# Patient Record
Sex: FEMALE
[De-identification: names, ages, dates, MRNs, and addresses within clinical notes are randomized per-mention and may not be internally consistent; named-entity substitution may affect disease eponyms.]

---

## 2017-09-25 ENCOUNTER — RESULT REVIEW (OUTPATIENT)
Age: 52
End: 2017-09-25

## 2018-09-24 PROBLEM — Z00.00 ENCOUNTER FOR PREVENTIVE HEALTH EXAMINATION: Status: ACTIVE | Noted: 2018-09-24

## 2018-12-05 ENCOUNTER — TRANSCRIPTION ENCOUNTER (OUTPATIENT)
Age: 53
End: 2018-12-05

## 2020-02-13 ENCOUNTER — TRANSCRIPTION ENCOUNTER (OUTPATIENT)
Age: 55
End: 2020-02-13

## 2020-02-14 ENCOUNTER — TRANSCRIPTION ENCOUNTER (OUTPATIENT)
Age: 55
End: 2020-02-14

## 2020-03-04 ENCOUNTER — RESULT REVIEW (OUTPATIENT)
Age: 55
End: 2020-03-04

## 2021-02-04 ENCOUNTER — RESULT REVIEW (OUTPATIENT)
Age: 56
End: 2021-02-04

## 2022-02-16 ENCOUNTER — RESULT REVIEW (OUTPATIENT)
Age: 57
End: 2022-02-16

## 2022-09-21 ENCOUNTER — NON-APPOINTMENT (OUTPATIENT)
Age: 57
End: 2022-09-21

## 2023-07-06 ENCOUNTER — NON-APPOINTMENT (OUTPATIENT)
Age: 58
End: 2023-07-06

## 2023-11-10 ENCOUNTER — NON-APPOINTMENT (OUTPATIENT)
Age: 58
End: 2023-11-10

## 2023-12-19 ENCOUNTER — NON-APPOINTMENT (OUTPATIENT)
Age: 58
End: 2023-12-19

## 2023-12-25 ENCOUNTER — NON-APPOINTMENT (OUTPATIENT)
Age: 58
End: 2023-12-25

## 2024-02-11 ENCOUNTER — NON-APPOINTMENT (OUTPATIENT)
Age: 59
End: 2024-02-11

## 2024-02-14 ENCOUNTER — NON-APPOINTMENT (OUTPATIENT)
Age: 59
End: 2024-02-14

## 2024-02-14 ENCOUNTER — APPOINTMENT (OUTPATIENT)
Dept: ORTHOPEDIC SURGERY | Facility: CLINIC | Age: 59
End: 2024-02-14
Payer: OTHER MISCELLANEOUS

## 2024-02-14 VITALS — WEIGHT: 150 LBS | BODY MASS INDEX: 24.11 KG/M2 | HEIGHT: 66 IN

## 2024-02-14 PROCEDURE — 25600 CLTX DST RDL FX/EPHYS SEP WO: CPT | Mod: RT

## 2024-02-14 PROCEDURE — 99204 OFFICE O/P NEW MOD 45 MIN: CPT | Mod: 57,25

## 2024-02-14 PROCEDURE — 73130 X-RAY EXAM OF HAND: CPT | Mod: RT

## 2024-02-14 PROCEDURE — 29075 APPL CST ELBW FNGR SHORT ARM: CPT | Mod: RT

## 2024-02-14 NOTE — WORK
[Fracture] : fracture [Was the competent medical cause of the injury] : was the competent medical cause of the injury [Are consistent with the injury] : are consistent with the injury [Consistent with my objective findings] : consistent with my objective findings [Moderate Partial] : moderate partial [Does not reveal pre-existing condition(s) that may affect treatment/prognosis] : does not reveal pre-existing condition(s) that may affect treatment/prognosis [Can return to work without limitations on ______] : can return to work without limitations on [unfilled] [No Rx restrictions] : No Rx restrictions. [I provided the services listed above] :  I provided the services listed above. [FreeTextEntry1] : good

## 2024-02-14 NOTE — IMAGING
[de-identified] : right hand/wrist swollen TTP about mattie Distal radius stiff fingers nvid  3 views of the wrist revied- nondisplaced distal radius fracture

## 2024-02-14 NOTE — ASSESSMENT
[FreeTextEntry1] : The patient was advised of the diagnosis. The natural history of the pathology was explained in full to the patient in layman's terms. All questions were answered. The risks and benefits of surgical and non-surgical treatment alternatives were explained in full to the patient. NSAIDs recommended.  Patient warned of risk of NSAID medication to stomach and GI tract, risk of increase blood pressure, cardiac risk, and risk of fluid retention.  The patient should clear taking medication with internist/PMD if any problem with heart, blood pressure, or GI system exists. The patient was advised to apply ice (wrapped in a towel or protective covering) to the area daily (20 minutes at a time, 2-4X/day short arm cats placed A cast was applied.  The importance of ice and elevation were discussed with the patient.  The risks were also discussed such as compartment syndrome and skin breakdown.  They were instructed to never put foreign objects down the cast.  Patients should call for increasing pain, worsening swelling, numbness, extremity discoloration, or any other concerns.

## 2024-02-14 NOTE — HISTORY OF PRESENT ILLNESS
[7] : 7 [5] : 5 [Dull/Aching] : dull/aching [Sharp] : sharp [Intermittent] : intermittent [Nothing helps with pain getting better] : Nothing helps with pain getting better [de-identified] : WC DOI 2/12/24  LHD F on day of injury fell down 2 steps at work onto right hand. told she has a small fracture. +pain to palpation works in personnel.  Pmhx: none Meds: none NKDA [] : no [FreeTextEntry1] : RT Wrist and RT hand  [FreeTextEntry5] : RT hand and Wrist slip and fall at work. denies N/T. Pt had X-rays taken at [facility] UC was Dx with a Small Fx.

## 2024-03-07 ENCOUNTER — APPOINTMENT (OUTPATIENT)
Dept: ORTHOPEDIC SURGERY | Facility: CLINIC | Age: 59
End: 2024-03-07
Payer: OTHER MISCELLANEOUS

## 2024-03-07 VITALS — BODY MASS INDEX: 24.11 KG/M2 | WEIGHT: 150 LBS | HEIGHT: 66 IN

## 2024-03-07 PROCEDURE — 73110 X-RAY EXAM OF WRIST: CPT | Mod: RT

## 2024-03-07 PROCEDURE — 99024 POSTOP FOLLOW-UP VISIT: CPT

## 2024-03-07 NOTE — ASSESSMENT
[FreeTextEntry1] : The patient was advised of the diagnosis. The natural history of the pathology was explained in full to the patient in layman's terms. All questions were answered. The risks and benefits of surgical and non-surgical treatment alternatives were explained in full to the patient.  C/w cast F/u in 3 weeks for xrays out of cast

## 2024-03-07 NOTE — IMAGING
[de-identified] : right wrist: SAC intact farom of fingers nvid  3 views of the wrist revied- nondisplaced distal radius fracture in acceptable alignment

## 2024-03-07 NOTE — HISTORY OF PRESENT ILLNESS
[7] : 7 [Dull/Aching] : dull/aching [5] : 5 [Sharp] : sharp [Intermittent] : intermittent [Nothing helps with pain getting better] : Nothing helps with pain getting better [de-identified] : WC DOI 2/12/24  3/7/24:  Pt is comfortable in cast  LHD F on day of injury fell down 2 steps at work onto right hand. told she has a small fracture. +pain to palpation works in personnel.  Pmhx: none Meds: none NKDA [FreeTextEntry1] : RT Wrist and RT hand  [] : no [FreeTextEntry5] : RT hand and Wrist slip and fall at work. denies N/T. Pt had X-rays taken at [facility] UC was Dx with a Small Fx.

## 2024-03-25 ENCOUNTER — APPOINTMENT (OUTPATIENT)
Dept: ORTHOPEDIC SURGERY | Facility: CLINIC | Age: 59
End: 2024-03-25
Payer: OTHER MISCELLANEOUS

## 2024-03-25 VITALS — WEIGHT: 150 LBS | BODY MASS INDEX: 24.11 KG/M2 | HEIGHT: 66 IN

## 2024-03-25 DIAGNOSIS — S52.571A OTHER INTRAARTICULAR FRACTURE OF LOWER END OF RIGHT RADIUS, INITIAL ENCOUNTER FOR CLOSED FRACTURE: ICD-10-CM

## 2024-03-25 DIAGNOSIS — M25.631 STIFFNESS OF RIGHT WRIST, NOT ELSEWHERE CLASSIFIED: ICD-10-CM

## 2024-03-25 PROCEDURE — 99024 POSTOP FOLLOW-UP VISIT: CPT

## 2024-03-25 PROCEDURE — 73110 X-RAY EXAM OF WRIST: CPT | Mod: RT

## 2024-03-25 NOTE — HISTORY OF PRESENT ILLNESS
[7] : 7 [5] : 5 [Dull/Aching] : dull/aching [Sharp] : sharp [Intermittent] : intermittent [Nothing helps with pain getting better] : Nothing helps with pain getting better [de-identified] : WC DOI 2/12/24  3/25/2024: Pt here for 6 week f/u of a right distal radius fracture.   3/7/24:  Pt is comfortable in cast  LHD F on day of injury fell down 2 steps at work onto right hand. told she has a small fracture. +pain to palpation works in personnel.  Pmhx: none Meds: none NKDA [] : no [FreeTextEntry1] : RT Wrist and RT hand  [FreeTextEntry5] : RT hand and Wrist slip and fall at work. denies N/T. Pt had X-rays taken at [facility] UC was Dx with a Small Fx.

## 2024-03-25 NOTE — WORK
[Fracture] : fracture [Are consistent with the injury] : are consistent with the injury [Was the competent medical cause of the injury] : was the competent medical cause of the injury [Consistent with my objective findings] : consistent with my objective findings [Can return to work without limitations on ______] : can return to work without limitations on [unfilled] [Does not reveal pre-existing condition(s) that may affect treatment/prognosis] : does not reveal pre-existing condition(s) that may affect treatment/prognosis [No Rx restrictions] : No Rx restrictions. [I provided the services listed above] :  I provided the services listed above. [Mild Partial] : mild partial [FreeTextEntry1] : good

## 2024-03-25 NOTE — ASSESSMENT
[FreeTextEntry1] : The patient was advised of the diagnosis. The natural history of the pathology was explained in full to the patient in layman's terms. All questions were answered. The risks and benefits of surgical and non-surgical treatment alternatives were explained in full to the patient.  Pt will begin OT for ROM/strengthening. RTO in 6 weeks for f/u care.

## 2024-03-25 NOTE — IMAGING
[de-identified] : right wrist: There is no longer ttp over the distal radius. Moderate right wrist stiffness is noted due to casting. farom of fingers nvid  3 view xray of the right wrist - nondisplaced distal radius fracture in acceptable alignment with solid callous formation.

## 2024-05-20 ENCOUNTER — APPOINTMENT (OUTPATIENT)
Dept: ORTHOPEDIC SURGERY | Facility: CLINIC | Age: 59
End: 2024-05-20

## 2025-07-05 ENCOUNTER — NON-APPOINTMENT (OUTPATIENT)
Age: 60
End: 2025-07-05